# Patient Record
Sex: OTHER/UNKNOWN | Race: WHITE | NOT HISPANIC OR LATINO | URBAN - METROPOLITAN AREA
[De-identification: names, ages, dates, MRNs, and addresses within clinical notes are randomized per-mention and may not be internally consistent; named-entity substitution may affect disease eponyms.]

---

## 2024-10-11 ENCOUNTER — OFFICE VISIT (OUTPATIENT)
Dept: URBAN - METROPOLITAN AREA CLINIC 113 | Facility: CLINIC | Age: 19
End: 2024-10-11

## 2024-10-11 NOTE — HPI-TODAY'S VISIT:
This is a 19-year-old female referred from Ms. Ness, NIK for chronic diarrhea, black tarry stools, left upper quadrant pain, possible Crohn's disease. Labs 9/24/2024 iron 29, TIBC 302, iron saturation 10 (L).  Ferritin 30.  Vitamin B12 471.  Folic acid 5.6.  CBC: WBC 9.3, hemoglobin 13.6, MCV 86, platelet 269.  Lipid panel normal with the exception of .  BMP normal.  LFTs: TB 0.2,  (upper limit of normal 121), ALT 18, AST 13.  TSH 1.970.  Hemoglobin A1c 5.2.  Celiac panel negative.  Saccharomyces cerevisiae IgG 36.4 (H), p-ANCA negative.  Reticulocyte count 1.5.

## 2024-10-29 ENCOUNTER — TELEPHONE ENCOUNTER (OUTPATIENT)
Dept: URBAN - METROPOLITAN AREA CLINIC 112 | Facility: CLINIC | Age: 19
End: 2024-10-29

## 2024-10-29 ENCOUNTER — DASHBOARD ENCOUNTERS (OUTPATIENT)
Age: 19
End: 2024-10-29

## 2024-10-29 ENCOUNTER — OFFICE VISIT (OUTPATIENT)
Dept: URBAN - METROPOLITAN AREA CLINIC 113 | Facility: CLINIC | Age: 19
End: 2024-10-29

## 2024-10-29 ENCOUNTER — LAB OUTSIDE AN ENCOUNTER (OUTPATIENT)
Dept: URBAN - METROPOLITAN AREA CLINIC 113 | Facility: CLINIC | Age: 19
End: 2024-10-29

## 2024-10-29 VITALS
BODY MASS INDEX: 53.64 KG/M2 | HEART RATE: 80 BPM | WEIGHT: 273.2 LBS | SYSTOLIC BLOOD PRESSURE: 113 MMHG | TEMPERATURE: 98.1 F | RESPIRATION RATE: 16 BRPM | DIASTOLIC BLOOD PRESSURE: 71 MMHG | HEIGHT: 60 IN

## 2024-10-29 PROBLEM — 236071009: Status: ACTIVE | Noted: 2024-10-29

## 2024-10-29 PROBLEM — 285387005: Status: ACTIVE | Noted: 2024-10-29

## 2024-10-29 PROBLEM — 449341000124102: Status: ACTIVE | Noted: 2024-10-29

## 2024-10-29 RX ORDER — FLUOXETINE HYDROCHLORIDE 20 MG/1
1 CAPSULE CAPSULE ORAL ONCE A DAY
Status: ACTIVE | COMMUNITY

## 2024-10-29 RX ORDER — DICYCLOMINE HYDROCHLORIDE 10 MG/1
1 TABLET CAPSULE ORAL
Qty: 90 | Refills: 3 | OUTPATIENT
Start: 2024-10-29 | End: 2025-02-25

## 2024-10-29 NOTE — HPI-TODAY'S VISIT:
This is a 19-year-old female referred from Ms. Grover NP for chronic diarrhea, black tarry stools, left upper quadrant pain, possible Crohn's disease. Labs 9/24/2024 iron 29, TIBC 302, iron saturation 10 (L).  Ferritin 30.  Vitamin B12 471.  Folic acid 5.6.  CBC: WBC 9.3, hemoglobin 13.6, MCV 86, platelet 269.  Lipid panel normal with the exception of .  BMP normal.  LFTs: TB 0.2,  (upper limit of normal 121), ALT 18, AST 13.  TSH 1.970.  Hemoglobin A1c 5.2.  Celiac panel negative.  Saccharomyces cerevisiae IgG 36.4 (H), p-ANCA negative.  Reticulocyte count 1.5. She reports onset of abdominal pain, diarrhea, and blood in her stools when she was 11 or 13.  At first, this was intermittent.  She states there was initially less pain and more blood than she is currently experiencing.  She reports consistent, chronic diarrhea.  Her stools are never normal.  She is having 8-9 loose or watery bowel movements per day with associated urgency.  She admits nocturnal stools.  She admits red blood or black material in her stool once or twice a week lasting 24 hours.  She has pain in the left side of her abdomen that is worse with bowel movements but not relieved with the stool.  She has frequent nausea.  She occasionally vomits if she is having frequent bowel movements.  She denies any other abdominal symptoms.  She states if she is vomiting, she is losing weight, otherwise, she is gaining.  She has not undergone a prior GI evaluation. She has a 2-year-old and 4-month-old child.  She is not breast-feeding.  She has an uncle who has Crohn's disease.  She denies a family history of colon cancer.  She lives in [place], [place] which is 3[place]/2 hours from this office.